# Patient Record
Sex: MALE | Race: WHITE | URBAN - METROPOLITAN AREA
[De-identification: names, ages, dates, MRNs, and addresses within clinical notes are randomized per-mention and may not be internally consistent; named-entity substitution may affect disease eponyms.]

---

## 2017-03-16 DIAGNOSIS — H91.90 HEARING LOSS: Primary | ICD-10-CM

## 2017-03-17 ENCOUNTER — OFFICE VISIT (OUTPATIENT)
Dept: AUDIOLOGY | Facility: CLINIC | Age: 42
End: 2017-03-17

## 2017-03-17 ENCOUNTER — OFFICE VISIT (OUTPATIENT)
Dept: OTOLARYNGOLOGY | Facility: CLINIC | Age: 42
End: 2017-03-17

## 2017-03-17 DIAGNOSIS — H90.12 CONDUCTIVE HEARING LOSS IN LEFT EAR: Primary | ICD-10-CM

## 2017-03-17 DIAGNOSIS — H91.90 HEARING LOSS: ICD-10-CM

## 2017-03-17 DIAGNOSIS — H90.0 CONDUCTIVE HEARING LOSS, BILATERAL: Primary | ICD-10-CM

## 2017-03-17 DIAGNOSIS — H92.13 OTORRHEA, BILATERAL: ICD-10-CM

## 2017-03-17 NOTE — PROGRESS NOTES
AUDIOLOGY REPORT    SUMMARY: Audiology visit completed. See audiogram for results.      RECOMMENDATIONS: Follow-up with ENT.    Cecy Candelaria.  Licensed Audiologist  [place] #[id number]

## 2017-03-17 NOTE — PATIENT INSTRUCTIONS
You will need  to schedule a follow up appointment in 6 months for an ear check.   Please call our clinic for any questions,concerns,or worsening symptoms.      Clinic #[phone]       Option 3  for the triage nurse.

## 2017-03-17 NOTE — MR AVS SNAPSHOT
After Visit Summary   3/17/2017    Brooks Naranjo    MRN: [id number]           Patient Information     Date Of Birth          1975        Visit Information        Provider Department      3/17/2017 11:30 AM Philly West AuD M [facility] Audiology        Today's Diagnoses     Conductive hearing loss, bilateral    -  1       Follow-ups after your visit        Your next 10 appointments already scheduled     Mar 17, 2017  1:00 PM CDT   (Arrive by 12:45 PM)   Return Visit with MD MIRIAM Lemus [facility] Ear Nose and Throat ([facility])    [address] [place] [id number] [phone]              Future tests that were ordered for you today     Open Future Orders        Priority Expected Expires Ordered    AUDIOLOGY ADULT REFERRAL Routine  2017 3/16/2017            Who to contact     Please call your clinic at [phone] to:    Ask questions about your health    Make or cancel appointments    Discuss your medicines    Learn about your test results    Speak to your doctor   If you have compliments or concerns about an experience at your clinic, or if you wish to file a complaint, please contact [facility] Patient Relations at [phone] or email us at [email or web address]         Additional Information About Your Visit        MyChart Information     RiskIQt is an electronic gateway that provides easy, online access to your medical records. With Stonewedge, you can request a clinic appointment, read your test results, renew a prescription or communicate with your care team.     To sign up for RiskIQt visit the website at [email or web address]   You will be asked to enter the access code listed below, as well as some personal information. Please follow the directions to create your username and password.     Your access code is: [email or web address]  Expires: 2017  7:30 AM     Your access code will   in 90 days. If you need help or a new code, please contact your [facility] or call [phone] for assistance.        Care EveryWhere ID     This is your Care EveryWhere ID. This could be used by other organizations to access your [place] medical records  [id number]         Blood Pressure from Last 3 Encounters:   07/18/16 (!) 134/94    Weight from Last 3 Encounters:   09/21/16 96.6 kg (212 lb 14.4 oz)   07/18/16 96.6 kg (213 lb)              We Performed the Following     AUDIOGRAM/TYMPANOGRAM - INTERFACE     [facility] Audiometry Thrshld Eval & Speech Recog ([id number])        Primary Care Provider    None Specified       No primary provider on file.        Thank you!     Thank you for choosing [facility] AUDIOLOGY  for your care. Our goal is always to provide you with excellent care. Hearing back from our patients is one way we can continue to improve our services. Please take a few minutes to complete the written survey that you may receive in the mail after your visit with us. Thank you!             Your Updated Medication List - Protect others around you: Learn how to safely use, store and throw away your medicines at www.disposemymeds.org.          This list is accurate as of: 3/17/17 12:12 PM.  Always use your most recent med list.                   Brand Name Dispense Instructions for use    CHANTIX PO          IBUPROFEN PO

## 2017-03-17 NOTE — NURSING NOTE
Chief Complaint   Patient presents with     RECHECK     re check with audio     Debbie Blanco Medical Assistant

## 2017-03-17 NOTE — PROGRESS NOTES
HISTORY OF PRESENT ILLNESS:  Brooks is a 41-year-old man who has had a left wall down mastoid cavity.  He has an atticotomy on the right side.  The patient reports that he has not had symptoms.  He really is not dizzy.  His hearing is, if anything, a little better in his right ear.      PHYSICAL EXAMINATION:  The exam shows that the drum is atelectatic on right.  Under the microscope, I cleaned it by removing wax from the tegmen using a right angle hook, alligator and a suction.  There is no evidence for cholesteatoma.  The atticotomy is clean.  The drum is retracted, especially anteriorly, but there are some irregularities in the surface of the drum.  Again, not leading to a cholesteatoma.  On the left side the mastoid cavity was cleaned.  There was minimal cerumen.  The cavity looks better, but functions more poorly.  Valenzuela lateralizes to the left.  Rinne is negative on the left.  His hearing threshold remains 60 dB in the left ear.  30 dB on the right which is an improvement on that side.  The patient has been doing well.  He wears hearing aids and does not have problems with them.      PLAN:  I talked to him about a bone-anchored hearing aid.  I think that he understands the issues, but for the moment wants to just continue.      ANI/steven

## 2017-03-17 NOTE — LETTER
Date:March 21, 2017      Patient was self referred, no letter generated. Do not send.        [facility] Physicians Health Information

## 2017-03-17 NOTE — MR AVS SNAPSHOT
After Visit Summary   3/17/2017    Brooks Naranjo    MRN: [id number]           Patient Information     Date Of Birth          1975        Visit Information        Provider Department      3/17/2017 1:00 PM Napoleon Daly MD [facility] Ear Nose and Throat        Today's Diagnoses     Conductive hearing loss in left ear    -  1    Otorrhea, bilateral          Care Instructions    You will need  to schedule a follow up appointment in 6 months for an ear check.   Please call our clinic for any questions,concerns,or worsening symptoms.      Clinic #[phone]       Option 3  for the triage nurse.        Follow-ups after your visit        Who to contact     Please call your clinic at [phone] to:    Ask questions about your health    Make or cancel appointments    Discuss your medicines    Learn about your test results    Speak to your doctor   If you have compliments or concerns about an experience at your clinic, or if you wish to file a complaint, please contact [facility] Patient Relations at [phone] or email us at [email or web address]         Additional Information About Your Visit        SiSafhart Information     Phokki is an electronic gateway that provides easy, online access to your medical records. With Phokki, you can request a clinic appointment, read your test results, renew a prescription or communicate with your care team.     To sign up for Phokki visit the website at [email or web address]   You will be asked to enter the access code listed below, as well as some personal information. Please follow the directions to create your username and password.     Your access code is: [email or web address]  Expires: 2017  7:30 AM     Your access code will  in 90 days. If you need help or a new code, please contact your [facility] or call [phone] for assistance.        Care EveryWhere ID     This is your  Care EveryWhere ID. This could be used by other organizations to access your [place] medical records  [id number]         Blood Pressure from Last 3 Encounters:   07/18/16 (!) 134/94    Weight from Last 3 Encounters:   09/21/16 96.6 kg (212 lb 14.4 oz)   07/18/16 96.6 kg (213 lb)              Today, you had the following     No orders found for display       Primary Care Provider    None Specified       No primary provider on file.        Thank you!     Thank you for choosing [facility] EAR NOSE AND THROAT  for your care. Our goal is always to provide you with excellent care. Hearing back from our patients is one way we can continue to improve our services. Please take a few minutes to complete the written survey that you may receive in the mail after your visit with us. Thank you!             Your Updated Medication List - Protect others around you: Learn how to safely use, store and throw away your medicines at www.disposemymeds.org.          This list is accurate as of: 3/17/17 11:59 PM.  Always use your most recent med list.                   Brand Name Dispense Instructions for use    CHANTIX PO      Reported on 3/17/2017       IBUPROFEN PO      Reported on 3/17/2017

## 2017-03-17 NOTE — LETTER
3/17/2017       RE: Brooks Naranjo  [address] [place] [address]     Dear Colleague,    Thank you for referring your patient, Brooks Naranjo, to the [place] [facility] at [facility]. Please see a copy of my visit note below.    HISTORY OF PRESENT ILLNESS:  Brooks is a 41-year-old man who has had a left wall down mastoid cavity.  He has an atticotomy on the right side.  The patient reports that he has not had symptoms.  He really is not dizzy.  His hearing is, if anything, a little better in his right ear.      PHYSICAL EXAMINATION:  The exam shows that the drum is atelectatic on right.  Under the microscope, I cleaned it by removing wax from the tegmen using a right angle hook, alligator and a suction.  There is no evidence for cholesteatoma.  The atticotomy is clean.  The drum is retracted, especially anteriorly, but there are some irregularities in the surface of the drum.  Again, not leading to a cholesteatoma.  On the left side the mastoid cavity was cleaned.  There was minimal cerumen.  The cavity looks better, but functions more poorly.  Valenzuela lateralizes to the left.  Rinne is negative on the left.  His hearing threshold remains 60 dB in the left ear.  30 dB on the right which is an improvement on that side.  The patient has been doing well.  He wears hearing aids and does not have problems with them.      PLAN:  I talked to him about a bone-anchored hearing aid.  I think that he understands the issues, but for the moment wants to just continue.      ANI/steven         Again, thank you for allowing me to participate in the care of your patient.      Sincerely,    Napoleon Daly MD